# Patient Record
Sex: FEMALE | Race: WHITE | NOT HISPANIC OR LATINO | Employment: FULL TIME | URBAN - METROPOLITAN AREA
[De-identification: names, ages, dates, MRNs, and addresses within clinical notes are randomized per-mention and may not be internally consistent; named-entity substitution may affect disease eponyms.]

---

## 2021-02-04 RX ORDER — MEDROXYPROGESTERONE ACETATE 10 MG/1
TABLET ORAL
Qty: 60 TABLET | Refills: 2 | Status: SHIPPED | OUTPATIENT
Start: 2021-02-04 | End: 2021-04-14 | Stop reason: SDUPTHER

## 2021-04-14 ENCOUNTER — OFFICE VISIT (OUTPATIENT)
Dept: OBSTETRICS AND GYNECOLOGY | Facility: CLINIC | Age: 61
End: 2021-04-14

## 2021-04-14 VITALS
DIASTOLIC BLOOD PRESSURE: 78 MMHG | SYSTOLIC BLOOD PRESSURE: 110 MMHG | HEIGHT: 64 IN | BODY MASS INDEX: 50.02 KG/M2 | WEIGHT: 293 LBS

## 2021-04-14 DIAGNOSIS — D25.9 UTERINE LEIOMYOMA, UNSPECIFIED LOCATION: ICD-10-CM

## 2021-04-14 DIAGNOSIS — Z01.419 ENCOUNTER FOR ANNUAL ROUTINE GYNECOLOGICAL EXAMINATION: Primary | ICD-10-CM

## 2021-04-14 PROCEDURE — 99396 PREV VISIT EST AGE 40-64: CPT | Performed by: NURSE PRACTITIONER

## 2021-04-14 RX ORDER — GABAPENTIN 600 MG/1
TABLET ORAL
COMMUNITY
Start: 2021-04-05

## 2021-04-14 RX ORDER — LEVOTHYROXINE SODIUM 0.12 MG/1
TABLET ORAL
COMMUNITY
Start: 2021-04-02

## 2021-04-14 RX ORDER — MONTELUKAST SODIUM 10 MG/1
10 TABLET ORAL NIGHTLY
COMMUNITY

## 2021-04-14 RX ORDER — CETIRIZINE HYDROCHLORIDE 10 MG/1
10 TABLET ORAL DAILY
COMMUNITY

## 2021-04-14 RX ORDER — MEDROXYPROGESTERONE ACETATE 10 MG/1
TABLET ORAL
Qty: 60 TABLET | Refills: 11 | Status: SHIPPED | OUTPATIENT
Start: 2021-04-14 | End: 2022-05-04 | Stop reason: SDUPTHER

## 2021-04-14 RX ORDER — ERGOCALCIFEROL 1.25 MG/1
CAPSULE ORAL
COMMUNITY
Start: 2021-03-31

## 2021-04-14 NOTE — PROGRESS NOTES
GYN Annual Exam     CC - Here for annual exam.     She has questions regarding increased risks of DVTs, as she has gotten the J&J COVID vaccine.  She was curious if the vaccine in addition to Provera, would cause any highten risk.  She was also wondering what warning signs were of DVT. Pt. Has gotten the J & J covid vaccine several weeks ago. She has had no symptoms of DVT. She denies SOB, chest pain, calf or thigh pain.       HPI  Debbie Regalado is a 60 y.o. female, , who presents for annual well woman exam.  She is postmenopausal.  Patient reports occasional vaginal bleeding.  She reports the bleeding as scant, with wiping.  She reports that there is a red tint to her urine, and she has seen her PCP who had told her she has a kidney stone. It has previously occurred rarely. She reports that when spotting is occurring, it seems to be coming from the vagina  Patient has been using provera. Bleeding will occur occasionally if she misses doses of Provera, which she says rarely happens.  Patient denies any additional questions, concerns or complaints, aside from what is mentioned above.  There were no changes to her medical or surgical history since her last visit.. Partner Status: Marital Status: .  New Partners since last visit: no.      She had a follow up ultrasound today regarding fibroids.  Two fibroids were noted, 1. 16.4 x 16.2 x 16.4 mm, Right lateral. 2. 16.3 x 15.4 x 19.4 mm, Posterior/ ill defined. Other small fibroids were noted.  Endometrial thickness was measured at 6.7 mm.  Both ovaries were visualized, and appear normal.  The cul de sac was normal, with no free fluid visualized.    Additional OB/GYN History   Current contraception: contraceptive methods: Post menopausal status  Desires to: do not start contraception  On HRT? Yes. Details: Provera  Last Pap : 2018- HPV Regardless- Normal   Last Completed Pap Smear       Status Date      PAP SMEAR No completions recorded        History  of abnormal Pap smear: no  Family history of uterine, colon, breast, or ovarian cancer: yes - Breast- Paternal Aunt. Colon- Matenal Uncle x2. MG  Performs monthly Self-Breast Exam: no  Last mammogram: 2021- Normal   Last Completed Mammogram       Status Date      MAMMOGRAM Done 2014 MAMMO SCREENING DIGITAL TOMOSYNTHESIS BILATERAL      LAST MAMMOGRAM AT [facility] IN [place], [place] 2021 was normal  Last colonoscopy: - Normal   Last Completed Colonoscopy       Status Date      COLONOSCOPY No completions recorded        Last DEXA: On 2020   Exercises Regularly: no  Feelings of Anxiety or Depression: no      Tobacco Usage?: No   OB History        3    Para   3    Term                AB        Living   3       SAB        TAB        Ectopic        Molar        Multiple        Live Births                    Health Maintenance   Topic Date Due   • Annual Gynecologic Pelvic and Breast Exam  Never done   • COLONOSCOPY  Never done   • ANNUAL PHYSICAL  Never done   • TDAP/TD VACCINES (2 - Tdap) 10/09/2008   • ZOSTER VACCINE (1 of 2) Never done   • MAMMOGRAM  2016   • HEPATITIS C SCREENING  Never done   • PAP SMEAR  Never done   • INFLUENZA VACCINE  2021   • COVID-19 Vaccine  Completed   • Pneumococcal Vaccine 0-64  Aged Out       The additional following portions of the patient's history were reviewed and updated as appropriate: allergies, current medications, past family history, past medical history, past social history, past surgical history and problem list.    Review of Systems   Constitutional: Negative.    Eyes: Negative.    Respiratory: Negative.    Cardiovascular: Negative.    Gastrointestinal: Negative.    Endocrine: Negative.    Genitourinary: Positive for vaginal bleeding ( occ spotting).   Musculoskeletal: Negative.    Skin: Negative.    Neurological: Negative.    Hematological: Negative.    Psychiatric/Behavioral: Negative.      All other systems reviewed and are  "negative.     I have reviewed and agree with the HPI, ROS, and historical information as entered above. Pennie Brant, APRN    Objective   /78   Ht 162.6 cm (64\")   Wt (!) 149 kg (329 lb 6.4 oz)   LMP  (LMP Unknown)   Breastfeeding No   BMI 56.54 kg/m²     Physical Exam  Exam conducted with a chaperone present.   Constitutional:       Appearance: Normal appearance. She is obese.   Cardiovascular:      Rate and Rhythm: Normal rate and regular rhythm.   Chest:      Breasts:         Left: Normal.   Abdominal:      Palpations: Abdomen is soft.   Genitourinary:     General: Normal vulva.      Vagina: Normal.      Cervix: Normal.      Uterus: Normal.       Adnexa: Right adnexa normal and left adnexa normal.      Rectum: Normal.   Neurological:      Mental Status: She is alert.            Assessment and Plan    Problem List Items Addressed This Visit     None        Ultrasound today for follow up fibroids. Fibroids slightly decreased from previous U/S last year  1. GYN annual well woman exam.   2. Had normal mammogram 1/2021 at [facility] in [place], [place] Reviewed self breasts exams with pt.   3. Normal colonoscopy in 12/2019  4. She will continue Provera 20mg daily as previously prescribed by .   5. Recommended use of Vitamin D and getting adequate calcium in her diet. (1500mg)  6. Reviewed exercise as a preventative health measures.   7. Reccommended Flu Vaccine in Fall of each year.  8. RTC in 1 year or PRN with problems.    Pennie Guo, APRN  04/14/2021  "

## 2022-05-04 ENCOUNTER — TELEPHONE (OUTPATIENT)
Dept: OBSTETRICS AND GYNECOLOGY | Facility: CLINIC | Age: 62
End: 2022-05-04

## 2022-05-04 RX ORDER — MEDROXYPROGESTERONE ACETATE 10 MG/1
TABLET ORAL
Qty: 60 TABLET | Refills: 5 | Status: SHIPPED | OUTPATIENT
Start: 2022-05-04

## 2022-05-04 RX ORDER — MEDROXYPROGESTERONE ACETATE 10 MG/1
TABLET ORAL
Qty: 60 TABLET | Refills: 11 | Status: SHIPPED | OUTPATIENT
Start: 2022-05-04

## 2022-05-04 NOTE — TELEPHONE ENCOUNTER
PT called and she is out of her Provera 10MG she wanted to see if we could call her in refills to last her until her annual in October.

## 2022-05-04 NOTE — TELEPHONE ENCOUNTER
Dr. Ludwig patient  Last annual: 04/14/2021  Next annual: 10/18/2022    Verified message received. S/w patient- requesting refill of Provera. Patient had annual scheduled for April 2022 but had to reschedule due to broken back. Patient verified pharmacy. Informed Rx will be sent to pharmacy, patient v/u.

## 2022-10-31 ENCOUNTER — TELEPHONE (OUTPATIENT)
Dept: OBSTETRICS AND GYNECOLOGY | Facility: CLINIC | Age: 62
End: 2022-10-31

## 2022-10-31 NOTE — TELEPHONE ENCOUNTER
I called the pharmacy because she should have refills on file. The pharmacy said it is going to be ready today at 2pm. I tried to call the pt and let her know. 5/2/2022 she was given two refills of the Provera for for 5 months and one with 11 refills.

## 2022-10-31 NOTE — TELEPHONE ENCOUNTER
PATIENT STATES SHE WOULD LIKE TO REQUEST A REFILL OF medroxyPROGESTERone (PROVERA) 10 MG tablet. RAN OUT ON 10/30/2022.    PHARMACY [facility] [id number] - [address] - [phone]  - [phone]   [phone]    CALL BACK [phone]

## 2022-11-08 DIAGNOSIS — D25.9 UTERINE LEIOMYOMA, UNSPECIFIED LOCATION: Primary | ICD-10-CM

## 2022-11-15 ENCOUNTER — TELEPHONE (OUTPATIENT)
Dept: OBSTETRICS AND GYNECOLOGY | Facility: CLINIC | Age: 62
End: 2022-11-15

## 2022-11-15 NOTE — TELEPHONE ENCOUNTER
Caller: Debbie Regalado    Relationship to patient: Self    Best call back number: [phone]    Patient is needing: PATIENT CALLED AND STATED THAT SHE HURT HER BACK BADLY EARLIER THIS YEAR AND ISN'T SURE IF SHE WILL BE ABLE TO LAY DOWN FLAT ON THE TABLE TO DO HER ANNUAL EXAM WHICH IS SCHEDULED FOR 11/16/22 AND SHE WOULD LIKE TO KNOW IF SHE CAN STILL COME IN FOR THIS APPT.       
Per LOS ok to wait and push out annual and f/u u/s. Patient is seeing back specialist on Thursday will call after she get plan of care to reschedule annual with u/s.   
Spoke with patient. She fell this year and has compression fracture in back that has not healed as she thought. Patient is scheduled for f/u u/s for fibroids and annual with LOS tomorrow.     States has a hard time lying on her back and raising her legs. States if LOS feels she needs to come in she will attempt to have u/s.     Patient is seeing back specialist this week.     Will discuss need for f/u with LOS and cb. She vu.   
no previous reaction

## 2023-04-19 ENCOUNTER — OFFICE VISIT (OUTPATIENT)
Dept: OBSTETRICS AND GYNECOLOGY | Facility: CLINIC | Age: 63
End: 2023-04-19
Payer: COMMERCIAL

## 2023-04-19 VITALS
SYSTOLIC BLOOD PRESSURE: 124 MMHG | HEIGHT: 64 IN | BODY MASS INDEX: 50.02 KG/M2 | WEIGHT: 293 LBS | DIASTOLIC BLOOD PRESSURE: 68 MMHG

## 2023-04-19 DIAGNOSIS — D25.1 INTRAMURAL LEIOMYOMA OF UTERUS: ICD-10-CM

## 2023-04-19 DIAGNOSIS — Z01.419 ROUTINE GYNECOLOGICAL EXAMINATION: Primary | ICD-10-CM

## 2023-04-19 PROBLEM — S32.000G COMPRESSION FRACTURE OF LUMBAR VERTEBRA WITH DELAYED HEALING: Status: ACTIVE | Noted: 2022-10-25

## 2023-04-19 PROBLEM — M79.7 FIBROMYOSITIS: Status: ACTIVE | Noted: 2023-03-31

## 2023-04-19 PROBLEM — M19.90 OSTEOARTHRITIS: Status: ACTIVE | Noted: 2021-03-09

## 2023-04-19 PROBLEM — M79.7 FIBROMYALGIA: Status: ACTIVE | Noted: 2021-03-23

## 2023-04-19 PROBLEM — M25.559 ARTHRALGIA OF HIP: Status: ACTIVE | Noted: 2021-03-09

## 2023-04-19 PROBLEM — R79.89 OTHER SPECIFIED ABNORMAL FINDINGS OF BLOOD CHEMISTRY: Status: ACTIVE | Noted: 2021-03-09

## 2023-04-19 PROBLEM — M54.16 LUMBAR RADICULOPATHY: Status: ACTIVE | Noted: 2021-03-23

## 2023-04-19 PROBLEM — R73.03 PREDIABETES: Status: ACTIVE | Noted: 2021-03-23

## 2023-04-19 PROBLEM — R76.8 POSITIVE ANTINUCLEAR ANTIBODY: Status: ACTIVE | Noted: 2019-12-09

## 2023-04-19 PROBLEM — D25.9 UTERINE FIBROID: Status: ACTIVE | Noted: 2023-03-31

## 2023-04-19 PROBLEM — J30.2 SEASONAL ALLERGIC RHINITIS: Status: ACTIVE | Noted: 2023-03-31

## 2023-04-19 PROBLEM — J45.909 ASTHMA: Status: ACTIVE | Noted: 2020-08-20

## 2023-04-19 PROBLEM — Z91.89 AT RISK FOR SLEEP APNEA: Status: ACTIVE | Noted: 2023-03-31

## 2023-04-19 PROBLEM — R79.82 HIGH C-REACTIVE PROTEIN: Status: ACTIVE | Noted: 2020-03-09

## 2023-04-19 PROBLEM — E03.9 HYPOTHYROIDISM: Status: ACTIVE | Noted: 2021-03-23

## 2023-04-19 PROBLEM — M62.81 MUSCLE WEAKNESS OF EXTREMITY: Status: ACTIVE | Noted: 2020-08-20

## 2023-04-19 PROBLEM — I10 HYPERTENSION: Status: ACTIVE | Noted: 2019-12-09

## 2023-04-19 PROBLEM — M51.26 HERNIATED LUMBAR INTERVERTEBRAL DISC: Status: ACTIVE | Noted: 2020-10-09

## 2023-04-19 PROBLEM — E55.9 VITAMIN D DEFICIENCY: Status: ACTIVE | Noted: 2021-03-09

## 2023-04-19 PROBLEM — E66.9 OBESITY: Status: ACTIVE | Noted: 2021-03-09

## 2023-04-19 PROBLEM — D17.20 LIPOMA OF UPPER ARM: Status: ACTIVE | Noted: 2022-01-03

## 2023-04-19 PROBLEM — Z87.11 HISTORY OF GASTRIC ULCER: Status: ACTIVE | Noted: 2019-12-09

## 2023-04-19 PROBLEM — M79.10 MUSCLE PAIN: Status: ACTIVE | Noted: 2019-12-09

## 2023-04-19 PROBLEM — K21.9 GASTROESOPHAGEAL REFLUX DISEASE: Status: ACTIVE | Noted: 2021-03-23

## 2023-04-19 PROBLEM — E78.2 MIXED HYPERLIPIDEMIA: Status: ACTIVE | Noted: 2021-09-21

## 2023-04-19 PROBLEM — R20.0 NUMBNESS OF TOES: Status: ACTIVE | Noted: 2020-08-20

## 2023-04-19 PROBLEM — L29.9 PRURITIC DISORDER: Status: ACTIVE | Noted: 2020-02-03

## 2023-04-19 RX ORDER — LISINOPRIL 20 MG/1
20 TABLET ORAL
COMMUNITY

## 2023-04-19 RX ORDER — MEDROXYPROGESTERONE ACETATE 10 MG/1
TABLET ORAL
Qty: 60 TABLET | Refills: 11 | Status: SHIPPED | OUTPATIENT
Start: 2023-04-19

## 2023-04-19 RX ORDER — GABAPENTIN 600 MG/1
1 TABLET ORAL DAILY
COMMUNITY
Start: 2023-03-29

## 2023-04-19 RX ORDER — ONDANSETRON 4 MG/1
4 TABLET, ORALLY DISINTEGRATING ORAL
COMMUNITY
Start: 2023-04-17 | End: 2023-04-24

## 2023-04-19 RX ORDER — LEVOTHYROXINE SODIUM 0.12 MG/1
1 TABLET ORAL DAILY
COMMUNITY
Start: 2023-03-29 | End: 2023-04-19 | Stop reason: SDUPTHER

## 2023-04-19 NOTE — PROGRESS NOTES
Gynecologic Annual Exam Note        GYN Annual Exam     CC - Here for annual exam.        HPI  Debbie Regalado is a 62 y.o. female, , who presents for annual well woman exam as a established patient.  She is postmenopausal. Denies vaginal bleeding.  Patient reports problems with: none. There were no changes to her medical or surgical history since her last visit.. Partner Status: Marital Status: .  She is is not currently sexually active. STD testing recommendations have been explained to the patient and she does not desire STD testing.    Has h/o fibroids. Ultrasound today shows EMC= 5.4mm, Fibroids have remained stable See Report.    Additional OB/GYN History   On HRT? Yes. Details: Provera    Last Pap : 2020. Results: negative. HPV: negative. (corinne)  Last Completed Pap Smear     This patient has no relevant Health Maintenance data.        History of abnormal Pap smear: no  Family history of uterine, colon, breast, or ovarian cancer: yes - MGM and Maternal Uncle x2- Colon CA and Paternal Aunt-Breast   Performs monthly Self-Breast Exam: no  Last mammogram:2022 at [facility] in [place], [place]. Negative per patient    Last Completed Mammogram     This patient has no relevant Health Maintenance data.        Last colonoscopy: , normal, pt told to repeat in 10 years    Last Completed Colonoscopy     This patient has no relevant Health Maintenance data.            Last bone density scan (DEXA): On 2020 and results were Osteopenia (corinne)  Exercises Regularly: no due to back injury   Feelings of Anxiety or Depression: no      Tobacco Usage?: No       Current Outpatient Medications:   •  cetirizine (zyrTEC) 10 MG tablet, Take 1 tablet by mouth Daily., Disp: , Rfl:   •  gabapentin (NEURONTIN) 600 MG tablet, Take 1 tablet by mouth Daily., Disp: , Rfl:   •  levothyroxine (SYNTHROID, LEVOTHROID) 125 MCG tablet, , Disp: , Rfl:   •  lisinopril (PRINIVIL,ZESTRIL) 20 MG tablet, Take 1 tablet by mouth.,  Disp: , Rfl:   •  medroxyPROGESTERone (PROVERA) 10 MG tablet, TAKE TWO TABLETS BY MOUTH DAILY, Disp: 60 tablet, Rfl: 11  •  metoprolol tartrate (LOPRESSOR) 25 MG tablet, prn, Disp: , Rfl:   •  montelukast (SINGULAIR) 10 MG tablet, Take 1 tablet by mouth Every Night., Disp: , Rfl:   •  ondansetron ODT (ZOFRAN-ODT) 4 MG disintegrating tablet, Take 1 tablet by mouth., Disp: , Rfl:   •  vitamin D (ERGOCALCIFEROL) 1.25 MG (81246 UT) capsule capsule, , Disp: , Rfl:     Pt. Request refill of Provera 10mg, takes 2 tablets daily     OB History        3    Para   3    Term                AB        Living   3       SAB        IAB        Ectopic        Molar        Multiple        Live Births   3                Past Medical History:   Diagnosis Date   • Abnormal uterine bleeding 2011   • Arthritis    • Broken arm    • Broken wrist    • Fibrocystic breast    • Fibromyalgia    • Hypothyroidism    • RLQ abdominal pain 2011   • Uterine leiomyoma         Past Surgical History:   Procedure Laterality Date   • BACK SURGERY      [id number]   • BACK SURGERY      herniated disc   • CATARACT EXTRACTION  2017   •  SECTION     •  SECTION     •  SECTION     • CHOLECYSTECTOMY     • COLONOSCOPY  2019    benign polyp- Dr. Mirza   • D & C HYSTEROSCOPY  2014    PMB   • HYSTEROSCOPY  2014    Fractional D&C PMB       Health Maintenance   Topic Date Due   • DXA SCAN  Never done   • COLORECTAL CANCER SCREENING  Never done   • Pneumococcal Vaccine 0-64 (1 - PCV) Never done   • TDAP/TD VACCINES (2 - Tdap) 10/09/2008   • ZOSTER VACCINE (1 of 2) Never done   • MAMMOGRAM  2015   • HEPATITIS C SCREENING  Never done   • ANNUAL PHYSICAL  Never done   • PAP SMEAR  Never done   • COVID-19 Vaccine (2 - Booster for Christal series) 2021   • Annual Gynecologic Pelvic and Breast Exam  04/15/2022   • INFLUENZA VACCINE  2023   • LIPID PANEL  2024       The additional  "following portions of the patient's history were reviewed and updated as appropriate: allergies, current medications, past family history, past medical history, past social history and past surgical history.    Review of Systems   Constitutional: Negative.    Cardiovascular: Negative.    Gastrointestinal: Negative.    Genitourinary: Negative.    Psychiatric/Behavioral: Negative.        I have reviewed and agree with the HPI, ROS, and historical information as entered above. Pennie Guo, APRN    Objective   /68 (BP Location: Right arm, Patient Position: Sitting, Cuff Size: Adult)   Ht 162.6 cm (64.02\")   Wt (!) 142 kg (313 lb)   LMP  (LMP Unknown)   BMI 53.70 kg/m²     Physical Exam  Vitals and nursing note reviewed. Exam conducted with a chaperone present.   Constitutional:       Appearance: Normal appearance. She is well-developed. She is obese.   HENT:      Head: Normocephalic and atraumatic.   Neck:      Thyroid: No thyroid mass or thyromegaly.   Cardiovascular:      Rate and Rhythm: Normal rate and regular rhythm.      Heart sounds: No murmur heard.  Pulmonary:      Effort: Pulmonary effort is normal. No retractions.      Breath sounds: Normal breath sounds. No wheezing, rhonchi or rales.   Chest:      Chest wall: No mass or tenderness.   Breasts:     Right: Normal. No mass, nipple discharge, skin change or tenderness.      Left: Normal. No mass, nipple discharge, skin change or tenderness.   Abdominal:      General: Bowel sounds are normal.      Palpations: Abdomen is soft. Abdomen is not rigid. There is no mass.      Tenderness: There is no abdominal tenderness. There is no guarding.      Hernia: No hernia is present. There is no hernia in the left inguinal area.   Genitourinary:     General: Normal vulva.      Labia:         Right: No rash, tenderness or lesion.         Left: No rash, tenderness or lesion.       Vagina: Normal. No vaginal discharge or lesions.      Cervix: No cervical motion " tenderness, discharge, lesion or cervical bleeding.      Uterus: Normal. Not enlarged, not fixed and not tender.       Adnexa: Right adnexa normal and left adnexa normal.        Right: No mass or tenderness.          Left: No mass or tenderness.        Rectum: Normal.   Musculoskeletal:      Cervical back: Normal range of motion. No muscular tenderness.   Neurological:      Mental Status: She is alert and oriented to person, place, and time.   Psychiatric:         Behavior: Behavior normal.            Assessment and Plan    Problem List Items Addressed This Visit        Genitourinary and Reproductive     Uterine fibroid    Relevant Medications    medroxyPROGESTERone (PROVERA) 10 MG tablet   Other Visit Diagnoses     Routine gynecological examination    -  Primary    Relevant Orders    LIQUID-BASED PAP SMEAR WITH HPV GENOTYPING IF ASCUS (JOON,COR,MAD)          1. GYN annual well woman exam.   2. Had normal colonoscopy in 2022. Was advised to repeat in 10 years.   3. Had normal mammogram at [facility] in [place], [place]. 11/2022  4. Request refill of provera 10mg , takes 2 tablets daily. Rx refilled.   5. Ultrasound for fibroid follow up. Fibroids have remained stable from previous year. (See report).   6. Reviewed monthly self breast exams.  Instructed to call with lumps, pain, or breast discharge.  Yearly mammograms ordered.  7. Recommended use of Vitamin D and getting adequate calcium in her diet. (1500mg)  8. Recommended Flu Vaccine in Fall of each year.  9. RTC in 1 year or PRN with problems.      Pennie Guo, APRN  04/19/2023

## 2023-04-21 LAB — REF LAB TEST METHOD: NORMAL

## 2024-04-17 ENCOUNTER — TELEPHONE (OUTPATIENT)
Dept: OBSTETRICS AND GYNECOLOGY | Facility: CLINIC | Age: 64
End: 2024-04-17
Payer: COMMERCIAL

## 2024-04-17 NOTE — TELEPHONE ENCOUNTER
Caller: Debbie Regalado    Relationship: Self    Best call back number: [phone]    What is the best time to reach you:     ANY    Who are you requesting to speak with (clinical staff, provider,  specific staff member):     DR SALGUERO OR NURSE    What was the call regarding:     PT HAS ANNUAL AND U/S 4/24/2024  PT JUST HAD KIDNEY STONE SURGERY AND WILL HAVE A STENT IN FOR 6 MONTHS    SHE WOULD LIKE TO HAVE THE APPT R/S FOR  6 MONTHS OUT  ALSO, PT WILL NEED REFILL OF MEDICATION    PLEASE ADVISE

## 2024-04-17 NOTE — TELEPHONE ENCOUNTER
Spoke to patient, she states that she has had a ureteral stent for the past month and is expected to have it for at least 6 months. She is scheduled for a surgery on 4/19/2024. Advised patient that she should be able to keep appt unless she feels as if she cannot. She has enough medication to last her until her appt next week. Advised patient to call our oiffce if she decides she cannot keep her appt and will need refills. Patient kushal.

## 2024-04-29 ENCOUNTER — TELEPHONE (OUTPATIENT)
Dept: OBSTETRICS AND GYNECOLOGY | Facility: CLINIC | Age: 64
End: 2024-04-29
Payer: MEDICARE

## 2024-04-29 DIAGNOSIS — D25.1 INTRAMURAL LEIOMYOMA OF UTERUS: ICD-10-CM

## 2024-04-29 RX ORDER — MEDROXYPROGESTERONE ACETATE 10 MG/1
TABLET ORAL
Qty: 60 TABLET | Refills: 3 | Status: SHIPPED | OUTPATIENT
Start: 2024-04-29

## 2024-07-17 DIAGNOSIS — D25.1 INTRAMURAL LEIOMYOMA OF UTERUS: Primary | ICD-10-CM

## 2024-07-18 ENCOUNTER — OFFICE VISIT (OUTPATIENT)
Dept: OBSTETRICS AND GYNECOLOGY | Facility: CLINIC | Age: 64
End: 2024-07-18
Payer: MEDICARE

## 2024-07-18 VITALS
DIASTOLIC BLOOD PRESSURE: 72 MMHG | BODY MASS INDEX: 50.02 KG/M2 | HEIGHT: 64 IN | SYSTOLIC BLOOD PRESSURE: 122 MMHG | WEIGHT: 293 LBS

## 2024-07-18 DIAGNOSIS — D25.1 INTRAMURAL LEIOMYOMA OF UTERUS: ICD-10-CM

## 2024-07-18 DIAGNOSIS — E66.01 MORBID OBESITY WITH BMI OF 50.0-59.9, ADULT: Primary | ICD-10-CM

## 2024-07-18 PROCEDURE — 99213 OFFICE O/P EST LOW 20 MIN: CPT | Performed by: OBSTETRICS & GYNECOLOGY

## 2024-07-18 PROCEDURE — 3078F DIAST BP <80 MM HG: CPT | Performed by: OBSTETRICS & GYNECOLOGY

## 2024-07-18 PROCEDURE — 3074F SYST BP LT 130 MM HG: CPT | Performed by: OBSTETRICS & GYNECOLOGY

## 2024-07-18 RX ORDER — MEDROXYPROGESTERONE ACETATE 10 MG/1
TABLET ORAL
Qty: 60 TABLET | Refills: 3 | Status: SHIPPED | OUTPATIENT
Start: 2024-07-18

## 2024-07-18 NOTE — PROGRESS NOTES
Chief Complaint   Patient presents with    Follow-up     fibroids       Subjective   HPI  Debbie Regalado is a 64 y.o. female, . Her last LMP was No LMP recorded (lmp unknown). Patient is postmenopausal.. who presents for follow up on fibroids.      At her last visit she was treated with expectant management. Since then she reports her symptoms/issue has remained unchanged. The patient denies vaginal bleeding or pelvic pain.      Additional OB/GYN History     Last Pap : 23 negative  Last Completed Pap Smear            PAP SMEAR (Every 3 Years) Next due on 2023  LIQUID-BASED PAP SMEAR WITH HPV GENOTYPING IF ASCUS (JOON,COR,MAD)                    Last mammogram: 23 @ [facility] negative  Last Completed Mammogram            MAMMOGRAM (Yearly) Next due on 2024  Mammo Screening Digital Tomosynthesis Bilateral With CAD    2014  MAMMOGRAPHY SCREENING DIGITAL TOMOSYNTHESIS BILATERAL                    Tobacco Usage?: No   OB History          3    Para   3    Term                AB        Living   3         SAB        IAB        Ectopic        Molar        Multiple        Live Births   3                  Current Outpatient Medications:     medroxyPROGESTERone (PROVERA) 10 MG tablet, TAKE TWO TABLETS BY MOUTH DAILY, Disp: 60 tablet, Rfl: 3    cetirizine (zyrTEC) 10 MG tablet, Take 1 tablet by mouth Daily., Disp: , Rfl:     gabapentin (NEURONTIN) 600 MG tablet, Take 1 tablet by mouth Daily., Disp: , Rfl:     levothyroxine (SYNTHROID, LEVOTHROID) 125 MCG tablet, , Disp: , Rfl:     lisinopril (PRINIVIL,ZESTRIL) 20 MG tablet, Take 1 tablet by mouth., Disp: , Rfl:     metoprolol tartrate (LOPRESSOR) 25 MG tablet, prn, Disp: , Rfl:     montelukast (SINGULAIR) 10 MG tablet, Take 1 tablet by mouth Every Night., Disp: , Rfl:     vitamin D (ERGOCALCIFEROL) 1.25 MG (01430 UT) capsule capsule, , Disp: , Rfl:      Past Medical History:   Diagnosis Date  "   Abnormal uterine bleeding 2011    Arthritis     Broken arm     Broken wrist     Fibrocystic breast     Fibromyalgia     Hypothyroidism     RLQ abdominal pain 2011    Uterine leiomyoma         Past Surgical History:   Procedure Laterality Date    BACK SURGERY      [id number]    BACK SURGERY      herniated disc    CATARACT EXTRACTION  2017     SECTION  1981     SECTION  1983     SECTION  1985    CHOLECYSTECTOMY      COLONOSCOPY  2019    benign polyp- Dr. Mirza    D & C HYSTEROSCOPY  2014    PMB    HYSTEROSCOPY  2014    Fractional D&C PMB       The additional following portions of the patient's history were reviewed and updated as appropriate: allergies and current medications.    Review of Systems   All other systems reviewed and are negative.      I have reviewed and agree with the HPI, ROS, and historical information as entered above. Linda Ludwig MD      Objective   /72   Ht 162.6 cm (64\")   Wt (!) 149 kg (328 lb)   LMP  (LMP Unknown)   BMI 56.30 kg/m²     Physical Exam  Constitutional:       Appearance: She is well-developed.   HENT:      Head: Normocephalic.   Eyes:      Conjunctiva/sclera: Conjunctivae normal.   Pulmonary:      Effort: Pulmonary effort is normal.   Psychiatric:         Behavior: Behavior normal.         Assessment & Plan     Encounter Diagnoses   Name Primary?    Intramural leiomyoma of uterus     Morbid obesity with BMI of 50.0-59.9, adult Yes       Plan     U/S reviewed and uterine leiomyoma are stable.  Her endometrium is 6 mm without any bleeding.  Plan is to continue Provera BID.  She is up to date on her mammogram and declines breast exam today.  Return in about 1 year (around 2025) for US with Next Visit.        Linda Ludwig MD  2024  "

## 2024-12-19 DIAGNOSIS — D25.1 INTRAMURAL LEIOMYOMA OF UTERUS: ICD-10-CM

## 2024-12-19 RX ORDER — MEDROXYPROGESTERONE ACETATE 10 MG
TABLET ORAL
Qty: 60 TABLET | Refills: 3 | Status: SHIPPED | OUTPATIENT
Start: 2024-12-19

## 2025-04-21 DIAGNOSIS — D25.1 INTRAMURAL LEIOMYOMA OF UTERUS: ICD-10-CM

## 2025-04-25 ENCOUNTER — TELEPHONE (OUTPATIENT)
Dept: OBSTETRICS AND GYNECOLOGY | Facility: CLINIC | Age: 65
End: 2025-04-25
Payer: MEDICARE

## 2025-04-25 DIAGNOSIS — D25.1 INTRAMURAL LEIOMYOMA OF UTERUS: ICD-10-CM

## 2025-04-25 RX ORDER — MEDROXYPROGESTERONE ACETATE 10 MG
20 TABLET ORAL DAILY
Qty: 60 TABLET | Refills: 3 | OUTPATIENT
Start: 2025-04-25

## 2025-04-25 RX ORDER — MEDROXYPROGESTERONE ACETATE 10 MG
TABLET ORAL
Qty: 60 TABLET | Refills: 1 | Status: SHIPPED | OUTPATIENT
Start: 2025-04-25

## 2025-04-25 NOTE — TELEPHONE ENCOUNTER
PT CALLED AND STATED THAT SHE NEEDS A REFILL OF HER medroxyPROGESTERone (PROVERA) 10 MG tablet AS HER PHARMACY HAS SENT A REFILL REQUEST IN AND HAS NOT HEARD FROM THE OFFICE

## 2025-06-19 DIAGNOSIS — D25.1 INTRAMURAL LEIOMYOMA OF UTERUS: ICD-10-CM

## 2025-06-19 RX ORDER — MEDROXYPROGESTERONE ACETATE 10 MG
TABLET ORAL
Qty: 60 TABLET | Refills: 0 | Status: SHIPPED | OUTPATIENT
Start: 2025-06-19

## 2025-07-21 DIAGNOSIS — D25.1 INTRAMURAL LEIOMYOMA OF UTERUS: Primary | ICD-10-CM

## 2025-07-22 ENCOUNTER — OFFICE VISIT (OUTPATIENT)
Dept: OBSTETRICS AND GYNECOLOGY | Facility: CLINIC | Age: 65
End: 2025-07-22
Payer: MEDICARE

## 2025-07-22 VITALS
HEIGHT: 64 IN | SYSTOLIC BLOOD PRESSURE: 132 MMHG | WEIGHT: 293 LBS | DIASTOLIC BLOOD PRESSURE: 84 MMHG | BODY MASS INDEX: 50.02 KG/M2

## 2025-07-22 DIAGNOSIS — Z87.42 HISTORY OF ENDOMETRIAL HYPERPLASIA: Primary | ICD-10-CM

## 2025-07-22 DIAGNOSIS — D25.1 INTRAMURAL LEIOMYOMA OF UTERUS: ICD-10-CM

## 2025-07-22 RX ORDER — DOXAZOSIN 8 MG/1
8 TABLET ORAL DAILY
COMMUNITY
Start: 2025-03-20 | End: 2026-03-20

## 2025-07-22 RX ORDER — MEDROXYPROGESTERONE ACETATE 10 MG
TABLET ORAL
Qty: 60 TABLET | Refills: 3 | Status: SHIPPED | OUTPATIENT
Start: 2025-07-22 | End: 2025-08-07 | Stop reason: SDUPTHER

## 2025-08-07 PROBLEM — Z87.42 HISTORY OF ENDOMETRIAL HYPERPLASIA: Status: ACTIVE | Noted: 2025-08-07

## 2025-08-07 RX ORDER — MEDROXYPROGESTERONE ACETATE 10 MG
TABLET ORAL
Qty: 60 TABLET | Refills: 11 | Status: SHIPPED | OUTPATIENT
Start: 2025-08-07